# Patient Record
Sex: FEMALE | Race: WHITE | NOT HISPANIC OR LATINO | Employment: FULL TIME | URBAN - METROPOLITAN AREA
[De-identification: names, ages, dates, MRNs, and addresses within clinical notes are randomized per-mention and may not be internally consistent; named-entity substitution may affect disease eponyms.]

---

## 2018-06-13 ENCOUNTER — HISTORICAL (OUTPATIENT)
Dept: ADMINISTRATIVE | Facility: HOSPITAL | Age: 63
End: 2018-06-13

## 2018-06-27 ENCOUNTER — HISTORICAL (OUTPATIENT)
Dept: ADMINISTRATIVE | Facility: HOSPITAL | Age: 63
End: 2018-06-27

## 2018-07-25 ENCOUNTER — HISTORICAL (OUTPATIENT)
Dept: ADMINISTRATIVE | Facility: HOSPITAL | Age: 63
End: 2018-07-25

## 2018-12-06 ENCOUNTER — HISTORICAL (OUTPATIENT)
Dept: ADMINISTRATIVE | Facility: HOSPITAL | Age: 63
End: 2018-12-06

## 2018-12-20 ENCOUNTER — HISTORICAL (OUTPATIENT)
Dept: RADIOLOGY | Facility: HOSPITAL | Age: 63
End: 2018-12-20

## 2019-11-06 ENCOUNTER — HISTORICAL (OUTPATIENT)
Dept: RADIOLOGY | Facility: HOSPITAL | Age: 64
End: 2019-11-06

## 2021-02-03 ENCOUNTER — HISTORICAL (OUTPATIENT)
Dept: RADIOLOGY | Facility: HOSPITAL | Age: 66
End: 2021-02-03

## 2022-04-09 ENCOUNTER — HISTORICAL (OUTPATIENT)
Dept: ADMINISTRATIVE | Facility: HOSPITAL | Age: 67
End: 2022-04-09
Payer: COMMERCIAL

## 2022-04-25 VITALS
SYSTOLIC BLOOD PRESSURE: 116 MMHG | HEIGHT: 62 IN | DIASTOLIC BLOOD PRESSURE: 77 MMHG | BODY MASS INDEX: 34.08 KG/M2 | WEIGHT: 185.19 LBS

## 2022-05-02 NOTE — HISTORICAL [facility]
This is a historical note converted from Emeka. Formatting and pictures may have been removed.  Please reference Emeka for original formatting and attached multimedia. History of Present Illness  Patient comes in today complaining of left hand and wrist pain. ?She?is present here with family. ?Patient complains of?pain in certain motions of her left wrist and hand. ?She denies any specific trauma or recent injury.? She denies any?numbness or tingling.? She has been taking some anti-inflammatories with minimal relief.? She occasion will have some swelling though this has improved today. ?She denies any other complaints she is right-hand dominant.  Physical Exam  Vitals & Measurements  HR:?86(Peripheral)? BP:?142/89?  HT:?158?cm? HT:?158?cm? WT:?82?kg? WT:?82?kg? BMI:?32.85?  Left upper extremity form soft and warm. ?Skin is intact with no signs or symptoms of DVT or infection. ?Examination of the left hand and wrist compartment soft and warm. ?Skin is intact with no signs or symptoms of DVT or infection. ?She is tender about the DRUJ she does have a mildly positive CMC grind she is stable to stressing there is no triggering?negative Tinels and Phalens. ?She is able make a full fist has full extension. ?She does have pain with resisted?wrist motion, she has some pain with?dorsiflexion of her wrist. ?There is no gross joint?effusion she is neurovascular intact distally. ?X-rays?3 views of the left wrist demonstrate no obvious fracture dislocation degenerative changes.  Assessment/Plan  1.?Left wrist sprain?S63.502A,? Left wrist sprain?S63.502A  ? At this time we discussed her physical exam and x-ray findings. ?Patient has a wrist sprain as well as some underlying arthritis and tendinitis. ?We have discussed various treatment options including?anti-inflammatories with appropriate precautions. ?We also have discussed hand and wrist exercises, we have also discussed a removable Velcro splint, I would like to see her  back in 2 weeks to see how she is progressing.  Ordered:  1036F Current tobacco non-user, 12/06/18 14:59:00 CST  1125F Pain severity quantified, pain present, 12/06/18 14:59:00 CST  1170F Functional Status Assessment, 12/06/18 14:59:00 CST  3008F Body Mass Index (BMI), documented, 12/06/18 14:59:00 CST  3077F Most recent systolic blood pressure, greater than or equal to 140 mm Hg, 12/06/18 14:59:00 CST  3079F Most recent diastolic blood pressure, 80 - 89 mm Hg, 12/06/18 14:59:00 CST  Clinic Follow up, *Est. 12/20/18 3:00:00 CST, Order for future visit, Left wrist sprain  Osteoarthritis of left wrist  Wrist tendonitis, [facility]  Office/Outpatient Visit Level 3 Established [id number] PC, Left wrist sprain  Osteoarthritis of left wrist  Wrist tendonitis, [facility], 12/06/18 14:59:00 CST  ?  2.?Osteoarthritis of left wrist?M19.032,? Osteoarthritis of left wrist?M19.032  Ordered:  1036F Current tobacco non-user, 12/06/18 14:59:00 CST  1125F Pain severity quantified, pain present, 12/06/18 14:59:00 CST  1170F Functional Status Assessment, 12/06/18 14:59:00 CST  3008F Body Mass Index (BMI), documented, 12/06/18 14:59:00 CST  3077F Most recent systolic blood pressure, greater than or equal to 140 mm Hg, 12/06/18 14:59:00 CST  3079F Most recent diastolic blood pressure, 80 - 89 mm Hg, 12/06/18 14:59:00 CST  Clinic Follow up, *Est. 12/20/18 3:00:00 CST, Order for future visit, Left wrist sprain  Osteoarthritis of left wrist  Wrist tendonitis, [facility]  Office/Outpatient Visit Level 3 Established [id number] PC, Left wrist sprain  Osteoarthritis of left wrist  Wrist tendonitis, [facility], 12/06/18 14:59:00 CST  ?  3.?Wrist tendonitis?M77.8,? Wrist tendonitis?M77.8  Ordered:  1036F Current tobacco non-user, 12/06/18 14:59:00 CST  1125F Pain severity quantified, pain present, 12/06/18 14:59:00 CST  1170F Functional Status Assessment, 12/06/18 14:59:00 CST  3008F Body Mass Index (BMI), documented, 12/06/18  14:59:00 CST  3077F Most recent systolic blood pressure, greater than or equal to 140 mm Hg, 12/06/18 14:59:00 CST  3079F Most recent diastolic blood pressure, 80 - 89 mm Hg, 12/06/18 14:59:00 CST  Clinic Follow up, *Est. 12/20/18 3:00:00 CST, Order for future visit, Left wrist sprain  Osteoarthritis of left wrist  Wrist tendonitis, [facility]  Durable Medical Equipment, 12/06/18 15:13:00 CST, velcro wrist splint, [id number], Wrist tendonitis  Office/Outpatient Visit Level 3 Established [id number] PC, Left wrist sprain  Osteoarthritis of left wrist  Wrist tendonitis, [facility], 12/06/18 14:59:00 CST  ?  Left wrist pain?M25.532  ?   Problem List/Past Medical History  Ongoing  Hyperlipidaemia  Hypothyroid  Historical  No qualifying data  Procedure/Surgical History  Hysterectomy   Medications  ALENDRONATE TAB 70MG, 70 mg= 1 tab(s), Oral, qWeek,? ?Not taking  AMITRIPTYLIN TAB 50MG, 50 mg= 1 tab(s), Oral, qPM,? ?Not taking  CITALOPRAM TAB 40MG, 40 mg= 1 tab(s), Oral, Daily  ESTRADIOL TAB 1MG, 1 mg= 1 tab(s), Oral, Daily  HYDROCO/APAP TAB 7.5-325, 1 tab(s), Oral, QID,? ?Not taking  LEVOTHYROXIN TAB 125MCG, 125 mcg= 1 tab(s), Oral, Daily  LISINOPRIL TAB 20MG, 20 mg= 1 tab(s), Oral, Daily  NITROFURANTN CAP 100MG, 100 mg= 1 cap(s), Oral, BID,? ?Not taking  PRAVASTATIN TAB 40MG  Allergies  codeine?(dizzy)  sulfamethoxazole?(nausea)  Social History  Alcohol  Never, 12/06/2018  Tobacco  Never smoker, N/A, 12/06/2018  Never smoker Use:., 06/13/2018  Family History  Coronary artery disease: Sister.  Diabetes mellitus type 2: Sister.  Kidney cancer, primary, with metastasis from kidney to other site 09-AUG-2016 15:31:24<$>: Father.  Primary malignant neoplasm of colon: Brother.  Health Maintenance  Health Maintenance  ???Pending?(in the next year)  ??? ??Due?  ??? ? ? ?ADL Screening due??12/07/18??and every 1??year(s)  ??? ? ? ?Advance Directive due??12/07/18??and every 1??year(s)  ??? ? ? ?Alcohol Misuse Screening  due??12/07/18??and every 1??year(s)  ??? ? ? ?Aspirin Therapy for CVD Prevention due??12/07/18??and every 1??year(s)  ??? ? ? ?Bone Density Screening due??12/07/18??Variable frequency  ??? ? ? ?Cognitive Screening due??12/07/18??and every 1??year(s)  ??? ? ? ?Colorectal Screening ([facility]) due??12/07/18??and every?  ??? ? ? ?Fall Risk Assessment due??12/07/18??and every 1??year(s)  ??? ? ? ?Functional Assessment due??12/07/18??and every 1??year(s)  ??? ? ? ?Geriatric Depression Screening due??12/07/18??and every 1??year(s)  ??? ? ? ?Pneumococcal Vaccine due??12/07/18??Variable frequency  ??? ? ? ?Pneumococcal Vaccine due??12/07/18??and every?  ??? ? ? ?Tetanus Vaccine due??12/07/18??and every 10??year(s)  ??? ? ? ?Zoster Vaccine due??12/07/18??and every 100??year(s)  ??? ??Due In Future?  ??? ? ? ?Obesity Screening not due until??12/06/19??and every 1??year(s)  ???Satisfied?(in the past 1 year)  ??? ??Satisfied?  ??? ? ? ?Blood Pressure Screening on??12/06/18.??Satisfied by Irma Dash.  ??? ? ? ?Body Mass Index Check on??12/06/18.??Satisfied by Irma Dash.  ??? ? ? ?Depression Screening on??07/25/18.??Satisfied by Barbra Cruz  ??? ? ? ?Obesity Screening on??12/06/18.??Satisfied by Irma Dash.  ?  ?      I, Peter May MD,  performed the initial face to face bedside interview with this patient regarding history of present illness, review of symptoms and relevant past medical, social and family history.  I completed an independent physical examination.  I was the initial provider who evaluated this patient. I have signed out the follow up of any pending tests (i.e. labs, radiological studies) to the ACP.  The ACP will complete the work up, interpret tests, administer medications if necessary and reassess the patient for an appropriate disposition.  If questions arise the ACP is expected to contact me for consultation. In the current work-flow I usually don't get to speak to nor reassess patients for final disposition once I sign the case out to the ACP (Unless otherwise documented).

## 2022-05-02 NOTE — HISTORICAL [facility]
This is a historical note converted from Emeka. Formatting and pictures may have been removed.  Please reference Emeka for original formatting and attached multimedia. Chief Complaint  6 WEEK FOLLOW UP RIGHT DISTAL FIBULA FX, NON-OP, WB IN BOOT, NO COMPLAINTS  History of Present Illness  Pt now 6 weeks s/p right distal fibula fx, non-op in boot. Here for f/u x-rays.  Review of Systems  Review of Systems?  ????Constitutional: ?Negative. ?  ????Eye: ?Negative. ?  ????Ear/Nose/Mouth/Throat: ?Negative. ?  ????Respiratory: ?Negative. ?  ????Cardiovascular: ?Negative. ?  ????Gastrointestinal: ?Negative. ?  ????Genitourinary: ?Negative. ?  ????Hematology/Lymphatics: ?Negative. ?  ????Endocrine: ?Negative. ?  ????Immunologic: ?Negative. ?  ????Musculoskeletal: ?Negative except HPI. ?  ????Integumentary: ?Negative. ?  ????Neurologic: ?Negative. ?  ????Psychiatric: ?Negative. ?  ????All other systems are negative  Physical Exam  Vitals & Measurements  HR:?82(Peripheral)? RR:?20? BP:?120/80?  HT:?158?cm? HT:?158?cm? WT:?82?kg? WT:?82?kg? BMI:?32.85?  ????General-Alert, oriented, no fever, chills  ????HEENT-Normocephalic, EOMI, moist oral mucosa, neck supple and nontender  ????Respiratory-Nonlabored respirations, symmetric chest expansion, chest wall nontender  ????Cardiac-Regular rate and rhythm, Pulses palpable in all extremities, Normal peripheral perfusion  ????Gastrointestinal-Soft, nontender, nondistended  ????Hemo/Lymph-No lymphadenopathy  ????[id number]-MYI-rj TTP over lateral ankle. Skin intact. +EHL/FHL intact. SILT distally.  ????Neurologic-Alert and oriented x4, cooperative  ????Dermatologic-Skin warm, pink, dry.  Assessment/Plan  Other fracture of upper and lower end of right fibula, subsequent encounter for closed fracture with routine healing  Ordered:  Durable Medical Equipment, 07/25/18 10:54:00 CDT, quad cane, [id number], Other fracture of upper and lower end of right fibula, subsequent encounter for  closed fracture with routine healing  Post-Op follow-up visit [id number] PC, Other fracture of upper and lower end of right fibula, subsequent encounter for closed fracture with routine healing, [facility], 07/25/18 10:54:00 CDT  ?  Orders:  Clinic Follow-up PRN, 07/25/18 10:54:00 CDT, Future Order, [facility]  ?  ?  Doing well. Continue FWBAT RLE. No restrictions. F/u PRN. Rx quad cane.   Problem List/Past Medical History  Ongoing  Hyperlipidaemia  Hypothyroid  Historical  No qualifying data  Procedure/Surgical History  Hysterectomy   Medications  ALENDRONATE TAB 70MG, 70 mg= 1 tab(s), Oral, qWeek  AMITRIPTYLIN TAB 50MG, 50 mg= 1 tab(s), Oral, qPM  CITALOPRAM TAB 40MG, 40 mg= 1 tab(s), Oral, Daily  ESTRADIOL TAB 1MG, 1 mg= 1 tab(s), Oral, Daily  HYDROCO/APAP TAB 7.5-325, 1 tab(s), Oral, QID,? ?Not taking  LEVOTHYROXIN TAB 125MCG, 125 mcg= 1 tab(s), Oral, Daily  LISINOPRIL TAB 20MG, 20 mg= 1 tab(s), Oral, Daily  NITROFURANTN CAP 100MG, 100 mg= 1 cap(s), Oral, BID,? ?Not taking  PRAVASTATIN TAB 40MG  Allergies  codeine?(dizzy)  sulfamethoxazole?(nausea)  Social History  Tobacco  Never smoker Use:., 06/13/2018  Family History  Coronary artery disease: Sister.  Diabetes mellitus type 2: Sister.  Kidney cancer, primary, with metastasis from kidney to other site 09-AUG-2016 15:31:24<$>: Father.  Primary malignant neoplasm of colon: Brother.  Health Maintenance  Health Maintenance  ???Pending?(in the next year)  ??? ??Due?  ??? ? ? ?Alcohol Misuse Screening due??07/25/18??and every 1??year(s)  ??? ? ? ?Aspirin Therapy for CVD Prevention due??07/25/18??and every 1??year(s)  ??? ? ? ?Cervical Cancer Screening due??07/25/18??and every?  ??? ? ? ?Colorectal Screening due??07/25/18??and every?  ??? ? ? ?Diabetes Screening due??07/25/18??and every?  ??? ? ? ?Influenza Vaccine due??07/25/18??and every?  ??? ? ? ?Tetanus Vaccine due??07/25/18??and every 10??year(s)  ??? ? ? ?Zoster Vaccine due??07/25/18??and  every 100??year(s)  ??? ??Due In Future?  ??? ? ? ?Blood Pressure Screening not due until??06/27/19??and every 1??year(s)  ??? ? ? ?Body Mass Index Check not due until??06/27/19??and every 1??year(s)  ??? ? ? ?Depression Screening not due until??06/27/19??and every 1??year(s)  ???Satisfied?(in the past 1 year)  ??? ??Satisfied?  ??? ? ? ?Blood Pressure Screening on??07/25/18.??Satisfied by Barbra Cruz  ??? ? ? ?Body Mass Index Check on??07/25/18.??Satisfied by Barbra Cruz  ??? ? ? ?Depression Screening on??07/25/18.??Satisfied by Barbra Cruz  ??? ? ? ?Obesity Screening on??07/25/18.??Satisfied by Barbra Cruz  ?  ?  Diagnostic Results  X-ray right ankle shows acceptable alignment, evidence of interval consolidation noted. Intact ankle mortise.      Patient seen and examined  Agree with above

## 2022-05-02 NOTE — HISTORICAL [facility]
This is a historical note converted from Emeka. Formatting and pictures may have been removed.  Please reference Emeka for original formatting and attached multimedia. Chief Complaint  right ankle fx after stepping in hole. Seen in ER in [place] in boot, doyle. Did not bring xray  History of Present Illness  Patient presents to clinic as new patient, 6 days s/p right ankle injury. She was placed in a boot and referred to orthopedics for evaluation and mgt. Pain is not bad. NWB RLE.  Review of Systems  Review of Systems?  ????Constitutional: ?Negative. ?  ????Eye: ?Negative. ?  ????Ear/Nose/Mouth/Throat: ?Negative. ?  ????Respiratory: ?Negative. ?  ????Cardiovascular: ?Negative. ?  ????Gastrointestinal: ?Negative. ?  ????Genitourinary: ?Negative. ?  ????Hematology/Lymphatics: ?Negative. ?  ????Endocrine: ?Negative. ?  ????Immunologic: ?Negative. ?  ????Musculoskeletal: ?Negative except HPI. ?  ????Integumentary: ?Negative. ?  ????Neurologic: ?Negative. ?  ????Psychiatric: ?Negative. ?  ????All other systems are negative  Physical Exam  Vitals & Measurements  HR:?82(Peripheral)? RR:?20? BP:?120/80?  HT:?158?cm? HT:?158?cm? WT:?82?kg? WT:?82?kg? BMI:?32.85?  ????General-Alert, oriented, no fever, chills  ????HEENT-Normocephalic, EOMI, moist oral mucosa, neck supple and nontender  ????Respiratory-Nonlabored respirations, symmetric chest expansion, chest wall nontender  ????Cardiac-Regular rate and rhythm, Pulses palpable in all extremities, Normal peripheral perfusion  ????Gastrointestinal-Soft, nontender, nondistended  ????Hemo/Lymph-No lymphadenopathy  ????Musculoskeletal-RLE-mild ankle?swelling, mild TTP over lateral ankle. +TA/GS, EHL/FHL intact. SILT distally. BCR all digits.  ????Neurologic-Alert and oriented x4, cooperative  ????Dermatologic-Skin warm, pink, dry.  Assessment/Plan  1.?Closed fracture of right distal fibula,? Other fracture of upper and lower end of right fibula, initial encounter for closed  fracture  Ordered:  Clinic Follow up, *Est. 06/27/18 3:00:00 CDT, Order for future visit, Closed fracture of right distal fibula, [facility]  distal fibula fx - ortho fx w/o marco PC, 06/13/18 8:41:00 CDT, [facility], Routine, 06/13/18 8:41:00 CDT  Office/Outpatient Visit Level 3 New [id number] PC, Closed fracture of right distal fibula, [facility], 06/13/18 8:41:00 CDT  ?  Fx ankle  ?  ?  ?  Non-op treatment right distal fibula fracture. OK for partial weight bearing to right leg in boot with walker or crutches. Will see back in 2 weeks for repeat x-rays to check alignment. Will advance to FWB at next visit if alignment maintained.   Problem List/Past Medical History  Ongoing  Hyperlipidaemia  Hypothyroid  Historical  No qualifying data  Procedure/Surgical History  Hysterectomy.  Medications  ALENDRONATE TAB 70MG, 70 mg= 1 tab(s), Oral, qWeek  AMITRIPTYLIN TAB 50MG, 50 mg= 1 tab(s), Oral, qPM  CITALOPRAM TAB 40MG, 40 mg= 1 tab(s), Oral, Daily  ESTRADIOL TAB 1MG, 1 mg= 1 tab(s), Oral, Daily  HYDROCO/APAP TAB 7.5-325, 1 tab(s), Oral, QID,? ?Not taking  LEVOTHYROXIN TAB 125MCG, 125 mcg= 1 tab(s), Oral, Daily  LISINOPRIL TAB 20MG, 20 mg= 1 tab(s), Oral, Daily  NITROFURANTN CAP 100MG, 100 mg= 1 cap(s), Oral, BID,? ?Not taking  PRAVASTATIN TAB 40MG  Allergies  codeine?(dizzy)  sulfamethoxazole?(nausea)  Social History  Tobacco  Never smoker Use:., 06/13/2018  Family History  Coronary artery disease: Sister.  Diabetes mellitus type 2: Sister.  Kidney cancer, primary, with metastasis from kidney to other site 09-AUG-2016 15:31:24<$>: Father.  Primary malignant neoplasm of colon: Brother.  Health Maintenance  Health Maintenance  ???Pending?(in the next year)  ??? ??Due?  ??? ? ? ?Alcohol Misuse Screening due??06/13/18??and every 1??year(s)  ??? ? ? ?Aspirin Therapy for CVD Prevention due??06/13/18??and every 1??year(s)  ??? ? ? ?Breast Cancer Screening due??06/13/18??Variable frequency  ??? ? ?  ?Cervical Cancer Screening due??06/13/18??and every 5??year(s)  ??? ? ? ?Colorectal Screening due??06/13/18??Variable frequency  ??? ? ? ?Depression Screening due??06/13/18??and every 1??year(s)  ??? ? ? ?Diabetes Screening due??06/13/18??Variable frequency  ??? ? ? ?Lipid Screening due??06/13/18??Variable frequency  ??? ? ? ?Tetanus Vaccine due??06/13/18??and every 10??year(s)  ??? ? ? ?Zoster Vaccine due??06/13/18??and every 100??year(s)  ??? ??Due In Future?  ??? ? ? ?Influenza Vaccine not due until??10/02/18??and every 1??year(s)  ???Satisfied?(in the past 1 year)  ??? ??Satisfied?  ??? ? ? ?Blood Pressure Screening on??06/13/18.??Satisfied by Barbra Cruz  ??? ? ? ?Body Mass Index Check on??06/13/18.??Satisfied by Barbra Cruz  ??? ? ? ?Obesity Screening on??06/13/18.??Satisfied by Barbra Cruz  ?  ?  Diagnostic Results  ?X-ray right ankle shows distal fibula fracture, non-displaced. Intact ankle mortise.      Patient seen and examined  Agree with above

## 2022-05-02 NOTE — HISTORICAL [facility]
This is a historical note converted from Emeka. Formatting and pictures may have been removed.  Please reference Emeka for original formatting and attached multimedia. Chief Complaint  2 week follow up right distal fibula fx, non-op, nwb in boot  History of Present Illness  Pt now 3 weeks out s/p right ankle, right distal fibula. Here for x-rays/alignment check. NWB in boot.  Review of Systems  Review of Systems?  ????Constitutional: ?Negative. ?  ????Eye: ?Negative. ?  ????Ear/Nose/Mouth/Throat: ?Negative. ?  ????Respiratory: ?Negative. ?  ????Cardiovascular: ?Negative. ?  ????Gastrointestinal: ?Negative. ?  ????Genitourinary: ?Negative. ?  ????Hematology/Lymphatics: ?Negative. ?  ????Endocrine: ?Negative. ?  ????Immunologic: ?Negative. ?  ????Musculoskeletal: ?Negative except HPI. ?  ????Integumentary: ?Negative. ?  ????Neurologic: ?Negative. ?  ????Psychiatric: ?Negative. ?  ????All other systems are negative  Physical Exam  Vitals & Measurements  HR:?82(Peripheral)? RR:?20? BP:?120/80?  HT:?158?cm? HT:?158?cm? WT:?82?kg? WT:?82?kg? BMI:?32.85?  ????General-Alert, oriented, no fever, chills  ????HEENT-Normocephalic, EOMI, moist oral mucosa, neck supple and nontender  ????Respiratory-Nonlabored respirations, symmetric chest expansion, chest wall nontender  ????Cardiac-Regular rate and rhythm, Pulses palpable in all extremities, Normal peripheral perfusion  ????Gastrointestinal-Soft, nontender, nondistended  ????Hemo/Lymph-No lymphadenopathy  ????Musculoskeletal-RLE-Mild?TTP over lateral ankle. Skin intact. +EHL/FHL intact. SILT distally.  ????Neurologic-Alert and oriented x4, cooperative  ????Dermatologic-Skin warm, pink, dry.  Assessment/Plan  Other fracture of upper and lower end of right fibula, subsequent encounter for closed fracture with routine healing  Ordered:  Clinic Follow up, *Est. 07/27/18 3:00:00 CDT, Order for future visit, Other fracture of upper and lower end of right fibula, subsequent  encounter for closed fracture with routine healing, [facility]  Post-Op follow-up visit [id number] PC, Other fracture of upper and lower end of right fibula, subsequent encounter for closed fracture with routine healing, [facility], 06/27/18 9:38:00 CDT  ?  ?  ?  Advance to [facility] RLE in boot. OK to come out of boot for ROM ankle, when at rest and at night. RTC 1 month for repeat x-rays. Consider PT if no improvement in next 4-7 days.   Problem List/Past Medical History  Ongoing  Hyperlipidaemia  Hypothyroid  Historical  No qualifying data  Procedure/Surgical History  Hysterectomy.  Medications  ALENDRONATE TAB 70MG, 70 mg= 1 tab(s), Oral, qWeek  AMITRIPTYLIN TAB 50MG, 50 mg= 1 tab(s), Oral, qPM  CITALOPRAM TAB 40MG, 40 mg= 1 tab(s), Oral, Daily  ESTRADIOL TAB 1MG, 1 mg= 1 tab(s), Oral, Daily  HYDROCO/APAP TAB 7.5-325, 1 tab(s), Oral, QID,? ?Not taking  LEVOTHYROXIN TAB 125MCG, 125 mcg= 1 tab(s), Oral, Daily  LISINOPRIL TAB 20MG, 20 mg= 1 tab(s), Oral, Daily  NITROFURANTN CAP 100MG, 100 mg= 1 cap(s), Oral, BID,? ?Not taking  PRAVASTATIN TAB 40MG  Allergies  codeine?(dizzy)  sulfamethoxazole?(nausea)  Social History  Tobacco  Never smoker Use:., 06/13/2018  Family History  Coronary artery disease: Sister.  Diabetes mellitus type 2: Sister.  Kidney cancer, primary, with metastasis from kidney to other site 09-AUG-2016 15:31:24<$>: Father.  Primary malignant neoplasm of colon: Brother.  Health Maintenance  Health Maintenance  ???Pending?(in the next year)  ??? ??Due?  ??? ? ? ?Alcohol Misuse Screening due??06/27/18??and every 1??year(s)  ??? ? ? ?Aspirin Therapy for CVD Prevention due??06/27/18??and every 1??year(s)  ??? ? ? ?Breast Cancer Screening due??06/27/18??Variable frequency  ??? ? ? ?Cervical Cancer Screening due??06/27/18??and every 5??year(s)  ??? ? ? ?Colorectal Screening due??06/27/18??Variable frequency  ??? ? ? ?Diabetes Screening due??06/27/18??Variable frequency  ??? ? ? ?Lipid  Screening due??06/27/18??Variable frequency  ??? ? ? ?Tetanus Vaccine due??06/27/18??and every 10??year(s)  ??? ? ? ?Zoster Vaccine due??06/27/18??and every 100??year(s)  ??? ??Due In Future?  ??? ? ? ?Influenza Vaccine not due until??10/02/18??and every 1??year(s)  ???Satisfied?(in the past 1 year)  ??? ??Satisfied?  ??? ? ? ?Blood Pressure Screening on??06/27/18.??Satisfied by Barbra Cruz  ??? ? ? ?Body Mass Index Check on??06/27/18.??Satisfied by Barbra Cruz  ??? ? ? ?Depression Screening on??06/27/18.??Satisfied by Barbra Cruz  ??? ? ? ?Obesity Screening on??06/27/18.??Satisfied by Barbra Cruz  ??? ? ? ?Tobacco Use Screening on??06/27/18.??Satisfied by Barbra Cruz  ?  ?  Diagnostic Results  X-ray right ankle shows unchanged alignment, intact ankle mortise.